# Patient Record
Sex: FEMALE | Race: WHITE | NOT HISPANIC OR LATINO
[De-identification: names, ages, dates, MRNs, and addresses within clinical notes are randomized per-mention and may not be internally consistent; named-entity substitution may affect disease eponyms.]

---

## 2017-03-04 ENCOUNTER — RX RENEWAL (OUTPATIENT)
Age: 27
End: 2017-03-04

## 2017-03-04 ENCOUNTER — MEDICATION RENEWAL (OUTPATIENT)
Age: 27
End: 2017-03-04

## 2017-04-24 ENCOUNTER — RX RENEWAL (OUTPATIENT)
Age: 27
End: 2017-04-24

## 2017-04-24 RX ORDER — NORETHINDRONE ACETATE AND ETHINYL ESTRADIOL 1MG-20(21)
1-20 KIT ORAL
Qty: 112 | Refills: 0 | Status: ACTIVE | COMMUNITY
Start: 2017-04-24 | End: 1900-01-01

## 2017-09-21 ENCOUNTER — APPOINTMENT (OUTPATIENT)
Dept: OBGYN | Facility: CLINIC | Age: 27
End: 2017-09-21
Payer: COMMERCIAL

## 2017-09-21 VITALS
HEIGHT: 69 IN | DIASTOLIC BLOOD PRESSURE: 72 MMHG | SYSTOLIC BLOOD PRESSURE: 107 MMHG | BODY MASS INDEX: 23.7 KG/M2 | WEIGHT: 160 LBS

## 2017-09-21 PROCEDURE — 99395 PREV VISIT EST AGE 18-39: CPT

## 2017-09-23 LAB
C TRACH RRNA SPEC QL NAA+PROBE: NORMAL
N GONORRHOEA RRNA SPEC QL NAA+PROBE: NORMAL
SOURCE TP AMPLIFICATION: NORMAL

## 2017-09-26 LAB — CYTOLOGY CVX/VAG DOC THIN PREP: NORMAL

## 2017-11-13 ENCOUNTER — APPOINTMENT (OUTPATIENT)
Dept: OBGYN | Facility: CLINIC | Age: 27
End: 2017-11-13
Payer: COMMERCIAL

## 2017-11-13 VITALS
HEART RATE: 82 BPM | SYSTOLIC BLOOD PRESSURE: 100 MMHG | BODY MASS INDEX: 22.22 KG/M2 | HEIGHT: 69 IN | DIASTOLIC BLOOD PRESSURE: 70 MMHG | WEIGHT: 150 LBS

## 2017-11-13 DIAGNOSIS — B96.89 ACUTE VAGINITIS: ICD-10-CM

## 2017-11-13 DIAGNOSIS — N76.0 ACUTE VAGINITIS: ICD-10-CM

## 2017-11-13 PROCEDURE — 99213 OFFICE O/P EST LOW 20 MIN: CPT

## 2017-11-14 LAB
C TRACH RRNA SPEC QL NAA+PROBE: NOT DETECTED
N GONORRHOEA RRNA SPEC QL NAA+PROBE: NOT DETECTED
SOURCE AMPLIFICATION: NORMAL

## 2017-11-15 LAB
SOURCE AMPLIFICATION: NORMAL
T VAGINALIS RRNA SPEC QL NAA+PROBE: NOT DETECTED

## 2018-09-25 ENCOUNTER — APPOINTMENT (OUTPATIENT)
Dept: OBGYN | Facility: CLINIC | Age: 28
End: 2018-09-25
Payer: COMMERCIAL

## 2018-09-25 VITALS
WEIGHT: 169 LBS | BODY MASS INDEX: 25.03 KG/M2 | SYSTOLIC BLOOD PRESSURE: 111 MMHG | DIASTOLIC BLOOD PRESSURE: 75 MMHG | HEIGHT: 69 IN

## 2018-09-25 DIAGNOSIS — Z01.419 ENCOUNTER FOR GYNECOLOGICAL EXAMINATION (GENERAL) (ROUTINE) W/OUT ABNORMAL FINDINGS: ICD-10-CM

## 2018-09-25 PROCEDURE — 99395 PREV VISIT EST AGE 18-39: CPT

## 2018-09-27 LAB
C TRACH RRNA SPEC QL NAA+PROBE: NOT DETECTED
N GONORRHOEA RRNA SPEC QL NAA+PROBE: NOT DETECTED
SOURCE TP AMPLIFICATION: NORMAL

## 2018-10-01 ENCOUNTER — MEDICATION RENEWAL (OUTPATIENT)
Age: 28
End: 2018-10-01

## 2018-10-01 LAB — CYTOLOGY CVX/VAG DOC THIN PREP: NORMAL

## 2018-10-01 RX ORDER — METRONIDAZOLE 7.5 MG/G
0.75 GEL VAGINAL
Qty: 1 | Refills: 1 | Status: ACTIVE | COMMUNITY
Start: 2017-11-13 | End: 1900-01-01

## 2018-10-09 ENCOUNTER — TRANSCRIPTION ENCOUNTER (OUTPATIENT)
Age: 28
End: 2018-10-09

## 2018-10-13 ENCOUNTER — RX RENEWAL (OUTPATIENT)
Age: 28
End: 2018-10-13

## 2020-01-21 ENCOUNTER — APPOINTMENT (OUTPATIENT)
Dept: OBGYN | Facility: CLINIC | Age: 30
End: 2020-01-21

## 2020-12-15 PROBLEM — N76.0 BACTERIAL VAGINOSIS: Status: RESOLVED | Noted: 2017-11-13 | Resolved: 2020-12-15

## 2021-05-23 ENCOUNTER — TRANSCRIPTION ENCOUNTER (OUTPATIENT)
Age: 31
End: 2021-05-23

## 2021-05-23 ENCOUNTER — NON-APPOINTMENT (OUTPATIENT)
Age: 31
End: 2021-05-23

## 2021-05-26 ENCOUNTER — APPOINTMENT (OUTPATIENT)
Dept: GYNECOLOGIC ONCOLOGY | Facility: CLINIC | Age: 31
End: 2021-05-26
Payer: COMMERCIAL

## 2021-06-07 PROBLEM — R87.613 PAP SMEAR OF CERVIX WITH HIGH GRADE SQUAMOUS INTRAEPITHELIAL LESION (HGSIL): Status: ACTIVE | Noted: 2021-06-07

## 2021-06-07 PROBLEM — N87.0 CIN I (CERVICAL INTRAEPITHELIAL NEOPLASIA I): Status: ACTIVE | Noted: 2021-05-25

## 2021-06-08 ENCOUNTER — NON-APPOINTMENT (OUTPATIENT)
Age: 31
End: 2021-06-08

## 2021-06-08 ENCOUNTER — APPOINTMENT (OUTPATIENT)
Dept: GYNECOLOGIC ONCOLOGY | Facility: CLINIC | Age: 31
End: 2021-06-08
Payer: COMMERCIAL

## 2021-06-08 VITALS
HEART RATE: 79 BPM | BODY MASS INDEX: 24.44 KG/M2 | RESPIRATION RATE: 16 BRPM | WEIGHT: 165 LBS | HEIGHT: 69 IN | OXYGEN SATURATION: 99 % | DIASTOLIC BLOOD PRESSURE: 71 MMHG | SYSTOLIC BLOOD PRESSURE: 113 MMHG

## 2021-06-08 DIAGNOSIS — R87.613 HIGH GRADE SQUAMOUS INTRAEPITHELIAL LESION ON CYTOLOGIC SMEAR OF CERVIX (HGSIL): ICD-10-CM

## 2021-06-08 DIAGNOSIS — N87.0 MILD CERVICAL DYSPLASIA: ICD-10-CM

## 2021-06-08 PROCEDURE — 99203 OFFICE O/P NEW LOW 30 MIN: CPT | Mod: 25

## 2021-06-08 PROCEDURE — 57452 EXAM OF CERVIX W/SCOPE: CPT | Mod: 59

## 2021-06-08 NOTE — LETTER BODY
[FreeTextEntry2] : Name: SHYLA DONOHUE \par : Oct 23 1990 \par \par Date of Visit: May 26, 2021 \par \par Dear Noah RIVERS\par \par I recently saw our mutual patient, SHYLA DONOHUE , in my office.\par \par Below, please see my findings.\par \par As always, it is my sincere pleasure to have the opportunity to participate in one of your patients' care. Please feel free to contact me with any questions or concerns that you may have regarding her care.\par \par Sincerely yours,\par \par Aurora Crisostomo MD\par

## 2021-06-08 NOTE — END OF VISIT
[FreeTextEntry3] : Written by Belinda Sanford PA-C, acting as scribe for Dr. Aurora Crisostomo.\par This note accurately reflects the work and decisions made by me.\par \par  [FreeTextEntry2] : This note accurately reflects the work and decisions made by me.\par

## 2021-06-08 NOTE — PROCEDURE
[Colposcopy] : colposcopy [Abnormal Pap] : an abnormal pap smear [HPV high risk] : PCR positive for high risk HPV [Patient] : the patient [Verbal Consent] : verbal consent was obtained prior to the procedure and is detailed in the patient's record [No Abnormalities] : no abnormalities [Biopsies Taken: # ___] : no biopsies were taken of the cervix [FreeTextEntry3] : Cervical colposcopy performed-TZ zone small, no signs of invasive disease

## 2021-06-08 NOTE — PHYSICAL EXAM
[de-identified] : Patient was interviewed and examined with chaperone present. Name of Chaperone: Belinda Sanford PA-C

## 2021-06-08 NOTE — HISTORY OF PRESENT ILLNESS
[FreeTextEntry1] : 31yo nulligravida LMP 5/18/21 presents today with referral from Dr. 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 Colposcopy performed 5/5/21 revealed CIN1 at 12 o'clock biopsy. Biopsy at 6 and ECC were benign. She denies any pelvic pain or issues with bowel/bladder habits. She reports heavy periods her entire life without change. Patient has not had vaccination with Gardasil. Denies smoking. \par \par

## 2021-06-08 NOTE — ASSESSMENT
[FreeTextEntry1] : 31yo with HGSIL pap and cervical biopsy with CIN1. Explained to patient we consider this a pap-colposcopy discrepancy. Colposcopy performed today which was satisfactory, small TZ noted and no signs of high grade lesions or invasive disease. Counselled pt on the importance of getting the Gardasil vaccine. She is eager to get it. Recommendation is for a another pap smear to be done in 1 year with her primary gyn. Stressed the importance of compliance. \par \par We discussed the etiology of cervical dysplasia and the relationship between the human papilloma virus (HPV) and cervical dysplasia and cancer.  I explained that anogenital transmission of HPV normally follows mucosal-to-mucosal contact.  I also explained what is understood to date with regards to how the HPV virus causes the pathological abnormailities that are seen in cervical dysplasia and cancer.  In addition to HPV, we discussed other risk factors such as smoking, use of oral contraceptives, and induced or acquired immune dysfunction.

## 2021-06-08 NOTE — CHIEF COMPLAINT
[FreeTextEntry1] : [facility]\par \par [facility] [phone] at [address] [place] [id number]\par